# Patient Record
Sex: FEMALE | Race: WHITE | URBAN - METROPOLITAN AREA
[De-identification: names, ages, dates, MRNs, and addresses within clinical notes are randomized per-mention and may not be internally consistent; named-entity substitution may affect disease eponyms.]

---

## 2017-03-10 ENCOUNTER — TELEPHONE (OUTPATIENT)
Dept: FAMILY MEDICINE CLINIC | Facility: CLINIC | Age: 42
End: 2017-03-10

## 2017-03-10 RX ORDER — BUDESONIDE AND FORMOTEROL FUMARATE DIHYDRATE 160; 4.5 UG/1; UG/1
1 AEROSOL RESPIRATORY (INHALATION) 2 TIMES DAILY
COMMUNITY
Start: 2014-03-17

## 2017-03-10 RX ORDER — CETIRIZINE HYDROCHLORIDE 10 MG/1
1 TABLET ORAL DAILY PRN
COMMUNITY
Start: 2011-10-06 | End: 2017-04-08

## 2017-03-10 RX ORDER — ALBUTEROL SULFATE 90 UG/1
1 AEROSOL, METERED RESPIRATORY (INHALATION) AS DIRECTED
COMMUNITY
Start: 2011-10-06

## 2017-03-10 NOTE — TELEPHONE ENCOUNTER
Patient states She has been unable to keep anything down since Wednesday  Including fluids. States She is having bowel movements. Gas seems trapped. Had some chest pain last week due to gas pain. States having some pain where colostomy was.     Advise

## 2017-03-13 ENCOUNTER — OFFICE VISIT (OUTPATIENT)
Dept: FAMILY MEDICINE CLINIC | Facility: CLINIC | Age: 42
End: 2017-03-13

## 2017-03-13 VITALS
BODY MASS INDEX: 32.38 KG/M2 | RESPIRATION RATE: 16 BRPM | HEART RATE: 62 BPM | HEIGHT: 64.5 IN | WEIGHT: 192 LBS | TEMPERATURE: 99 F | SYSTOLIC BLOOD PRESSURE: 126 MMHG | DIASTOLIC BLOOD PRESSURE: 72 MMHG

## 2017-03-13 DIAGNOSIS — Z20.818 EXPOSURE TO STREPTOCOCCAL PHARYNGITIS: Primary | ICD-10-CM

## 2017-03-13 DIAGNOSIS — R11.2 NAUSEA AND VOMITING, INTRACTABILITY OF VOMITING NOT SPECIFIED, UNSPECIFIED VOMITING TYPE: ICD-10-CM

## 2017-03-13 LAB
CONTROL LINE PRESENT WITH A CLEAR BACKGROUND (YES/NO): YES YES/NO
STREP GRP A CUL-SCR: NEGATIVE

## 2017-03-13 PROCEDURE — 87081 CULTURE SCREEN ONLY: CPT | Performed by: NURSE PRACTITIONER

## 2017-03-13 PROCEDURE — 87880 STREP A ASSAY W/OPTIC: CPT | Performed by: NURSE PRACTITIONER

## 2017-03-13 PROCEDURE — 99213 OFFICE O/P EST LOW 20 MIN: CPT | Performed by: NURSE PRACTITIONER

## 2017-03-13 RX ORDER — FEXOFENADINE HCL 180 MG/1
180 TABLET ORAL DAILY
COMMUNITY

## 2017-03-13 NOTE — PROGRESS NOTES
HPI:    Patient ID: Jyotsna Arredondo is a 37year old female. HPI     Had been having chest pains that were on and off. Noted that gas not coming through. Started throwing up on Wednesday. Went to the ER. Had EKG, CXR, CT abdomen, and labs.  No significant Cardiovascular: Normal rate, regular rhythm and normal heart sounds. Exam reveals no gallop and no friction rub. No murmur heard. Pulmonary/Chest: Effort normal and breath sounds normal. No respiratory distress. She has no wheezes. She has no rales.

## 2017-03-14 NOTE — PATIENT INSTRUCTIONS
Strep negative. Culture pending. Continue with Zofran as needed for nausea. Continue with a bland diet. Return to clinic if symptoms worsen or not continuing to improve.   Otherwise follow-up as needed

## 2017-03-15 ENCOUNTER — TELEPHONE (OUTPATIENT)
Dept: FAMILY MEDICINE CLINIC | Facility: CLINIC | Age: 42
End: 2017-03-15

## 2017-03-15 NOTE — TELEPHONE ENCOUNTER
----- Message from ALANNAH Foley sent at 3/15/2017  3:03 PM CDT -----  Strep is negative. Please let patient know. Thanks.  Debbie Melendrez, 03/15/2017, 3:03 PM

## 2017-04-08 ENCOUNTER — OFFICE VISIT (OUTPATIENT)
Dept: FAMILY MEDICINE CLINIC | Facility: CLINIC | Age: 42
End: 2017-04-08

## 2017-04-08 VITALS
DIASTOLIC BLOOD PRESSURE: 70 MMHG | RESPIRATION RATE: 16 BRPM | SYSTOLIC BLOOD PRESSURE: 122 MMHG | WEIGHT: 182 LBS | HEART RATE: 88 BPM | TEMPERATURE: 98 F | BODY MASS INDEX: 31 KG/M2

## 2017-04-08 DIAGNOSIS — J30.89 ALLERGIC RHINITIS DUE TO OTHER ALLERGIC TRIGGER, UNSPECIFIED RHINITIS SEASONALITY: Primary | ICD-10-CM

## 2017-04-08 DIAGNOSIS — H10.13 ALLERGIC CONJUNCTIVITIS, BILATERAL: ICD-10-CM

## 2017-04-08 DIAGNOSIS — B36.0 PITYRIASIS VERSICOLOR: ICD-10-CM

## 2017-04-08 PROBLEM — H10.10 ALLERGIC CONJUNCTIVITIS: Status: ACTIVE | Noted: 2017-04-08

## 2017-04-08 PROCEDURE — 99214 OFFICE O/P EST MOD 30 MIN: CPT | Performed by: FAMILY MEDICINE

## 2017-04-08 RX ORDER — KETOCONAZOLE 200 MG/1
200 TABLET ORAL DAILY
Qty: 5 TABLET | Refills: 0 | Status: SHIPPED | OUTPATIENT
Start: 2017-04-08

## 2017-04-08 RX ORDER — AZELASTINE HYDROCHLORIDE 0.5 MG/ML
1 SOLUTION/ DROPS OPHTHALMIC 2 TIMES DAILY
Qty: 6 ML | Refills: 5 | OUTPATIENT
Start: 2017-04-08

## 2017-04-08 RX ORDER — OLOPATADINE HYDROCHLORIDE 1 MG/ML
1 SOLUTION/ DROPS OPHTHALMIC 2 TIMES DAILY
Qty: 5 ML | Refills: 3 | Status: SHIPPED | OUTPATIENT
Start: 2017-04-08 | End: 2017-04-08

## 2017-04-08 NOTE — PATIENT INSTRUCTIONS
Use Patanol eye drops twice a day to help with itchy eyes. Please quit smoking. Take Ketoconazole daily for 5 days.

## 2017-04-08 NOTE — PROGRESS NOTES
[address]  PROGRESS NOTE  Chief Complaint:   Patient presents with:  Burning In Eyes: Eyes are burning and itching, and also woke up with a lot of mucus around eyes.  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  Disp: 5 tablet Rfl: 0   Azelastine HCl 0.05 % Ophthalmic Solution Place 1 drop into both eyes 2 ( awake and oriented, well developed, well nourished, no apparent distress. HEENT:  Head:  Normocephalic, atraumatic Eyes: EOMI, PERRLA, no scleral icterus, conjunctivae clear bilaterally, no eye discharge. There is no drainage from her eyes now.   The scle Azelastine HCl 0.05 % Ophthalmic Solution 6 mL 5      Sig: Place 1 drop into both eyes 2 (two) times daily. Patient/Caregiver Education: Patient/Caregiver Education: There are no barriers to learning. Medical education done.    Outcome: Patient ve

## 2018-02-13 ENCOUNTER — OFFICE VISIT (OUTPATIENT)
Dept: FAMILY MEDICINE CLINIC | Facility: CLINIC | Age: 43
End: 2018-02-13

## 2018-02-13 VITALS
HEART RATE: 69 BPM | HEIGHT: 64.5 IN | OXYGEN SATURATION: 99 % | RESPIRATION RATE: 16 BRPM | WEIGHT: 205 LBS | BODY MASS INDEX: 34.57 KG/M2 | DIASTOLIC BLOOD PRESSURE: 70 MMHG | TEMPERATURE: 99 F | SYSTOLIC BLOOD PRESSURE: 110 MMHG

## 2018-02-13 DIAGNOSIS — K21.9 GASTROESOPHAGEAL REFLUX DISEASE, ESOPHAGITIS PRESENCE NOT SPECIFIED: ICD-10-CM

## 2018-02-13 DIAGNOSIS — J02.9 SORE THROAT: Primary | ICD-10-CM

## 2018-02-13 LAB
CONTROL LINE PRESENT WITH A CLEAR BACKGROUND (YES/NO): YES YES/NO
STREP GRP A CUL-SCR: NEGATIVE

## 2018-02-13 PROCEDURE — 87880 STREP A ASSAY W/OPTIC: CPT | Performed by: NURSE PRACTITIONER

## 2018-02-13 PROCEDURE — 87081 CULTURE SCREEN ONLY: CPT | Performed by: NURSE PRACTITIONER

## 2018-02-13 PROCEDURE — 99214 OFFICE O/P EST MOD 30 MIN: CPT | Performed by: NURSE PRACTITIONER

## 2018-02-13 NOTE — PROGRESS NOTES
CHIEF COMPLAINT:   Patient presents with:  Sore Throat: x 2 weeks - denies fever  Rash: on chest has had for months      HPI:   Noemi Aguilera is a 40year old female who presents to clinic today with complaints of sore throat- states she felt like a pil See HPI  SKIN: no unusual skin lesions or rashes  HEENT: See HPI  THYROID: denies complaints   LUNGS: No cough, shortness of breath, or wheezing. CARDIOVASCULAR: No chest pain, palpitations  GI: No N/V/C/D.   NEURO: denies complaints    EXAM:   /70 ( ultrasound.  - if normal and symptoms persist- then GI referral.     Discussed with patient the tenderness to her right upper quadrant could be a gallbladder problem–patient has chronic diarrhea from her colectomy–states she is not sure she still has a gall

## 2018-02-13 NOTE — PATIENT INSTRUCTIONS
For your stomach avoid caffeine, alcohol, cigarettes, spicy/acidic foods, and peppermint. Also eat small meals, do not eat before bedtime, also avoid ibuprofen/Aleve/aspirin.        Try Nexium once a day for 1-2 weeks - if not improving then would recommen

## 2018-02-15 ENCOUNTER — TELEPHONE (OUTPATIENT)
Dept: FAMILY MEDICINE CLINIC | Facility: CLINIC | Age: 43
End: 2018-02-15

## 2018-02-15 NOTE — TELEPHONE ENCOUNTER
----- Message from ALANNAH Medrano sent at 2/15/2018 11:53 AM CST -----  Negative strep culture- please notify patient

## 2018-07-10 ENCOUNTER — TELEPHONE (OUTPATIENT)
Dept: FAMILY MEDICINE CLINIC | Facility: CLINIC | Age: 43
End: 2018-07-10

## 2018-07-10 NOTE — TELEPHONE ENCOUNTER
I called and spoke with Chun Stokes about the change in insurance coverage. Unfortunately because she is with a different medication plan I am not able to provide care for her. She will work on changing her insurance plan as soon as possible.

## 2018-07-10 NOTE — TELEPHONE ENCOUNTER
Patient states Dr Hailey Sumner informed Her Mom that Dr Hailey Sumner wanted to speak with  Her directly. Please advise.

## 2019-05-21 ENCOUNTER — TELEPHONE (OUTPATIENT)
Dept: FAMILY MEDICINE CLINIC | Facility: CLINIC | Age: 44
End: 2019-05-21

## 2019-05-21 NOTE — TELEPHONE ENCOUNTER
Patient confirmed She now is covered under [employer]. Not covered by [employer]. New  PCP.   Bev Blanco, 05/21/19, 1:01 PM

## 2021-07-12 ENCOUNTER — TELEPHONE (OUTPATIENT)
Dept: FAMILY MEDICINE CLINIC | Facility: CLINIC | Age: 46
End: 2021-07-12

## 2021-07-12 NOTE — TELEPHONE ENCOUNTER
Operative Report from 2002 located in patient's  Paper chart. Per -it does appear that the appendix were removed at time of bowel surgery. Patient informed.

## 2021-07-12 NOTE — TELEPHONE ENCOUNTER
Pt calling and states she used to see Dr. Zilphia Krabbe and back in 2000 or 2001 she had surgery in [place] and had parts of her intestines removed. She needs to know if she had her appendix removed then as well. Please c/b.

## (undated) NOTE — MR AVS SNAPSHOT
[address]  [place] [address] [id number]  [phone]               Thank you for choosing us for your health care visit with Marsha Aguilar MD.  We are glad to serve you and happy to provide you with this summary o These medications were sent to [address], [address] AT [address] [place] [address] 23, [phone], [address] [place] [id number]    Hours:  24-hours Phone:  [phone]    - ketoconazole 200 MG Tabs

## (undated) NOTE — Clinical Note
Date: 3/13/2017    Patient Name: Selena Ochoa          To Whom it may concern: This letter has been written at the patient's request. The above patient was seen at the [facility] for treatment of a medical condition.     This patient shou

## (undated) NOTE — MR AVS SNAPSHOT
[address]  [place] [address] [id number]  [phone]               Thank you for choosing us for your health care visit with ALANNAH Celeste.   We are glad to serve you and happy to provide you with this summary o Return to clinic if symptoms worsen or not continuing to improve.   Otherwise follow-up as needed       Allergies as of Mar 13, 2017     Sulfa Antibiotics     Other reaction(s): rash                Today's Vital Signs     BP Pulse Temp Height Weight BMI If you have questions, you can call [phone] to talk to our [facility] Staff. Remember, [facility] is NOT to be used for urgent needs. For medical emergencies, dial 911.         Educational Information     Healthy Diet and Regular Exercise  [facility]